# Patient Record
Sex: FEMALE | Race: ASIAN | URBAN - METROPOLITAN AREA
[De-identification: names, ages, dates, MRNs, and addresses within clinical notes are randomized per-mention and may not be internally consistent; named-entity substitution may affect disease eponyms.]

---

## 2018-10-09 ENCOUNTER — PRENATAL OFFICE VISIT (OUTPATIENT)
Dept: NURSING | Facility: CLINIC | Age: 23
End: 2018-10-09
Payer: MEDICAID

## 2018-10-09 ENCOUNTER — RESULT FOLLOW UP (OUTPATIENT)
Dept: OBGYN | Facility: CLINIC | Age: 23
End: 2018-10-09

## 2018-10-09 ENCOUNTER — PRENATAL OFFICE VISIT (OUTPATIENT)
Dept: OBGYN | Facility: CLINIC | Age: 23
End: 2018-10-09
Payer: MEDICAID

## 2018-10-09 VITALS
SYSTOLIC BLOOD PRESSURE: 110 MMHG | WEIGHT: 97.6 LBS | HEIGHT: 59 IN | HEART RATE: 91 BPM | DIASTOLIC BLOOD PRESSURE: 76 MMHG | TEMPERATURE: 98.4 F | BODY MASS INDEX: 19.68 KG/M2

## 2018-10-09 VITALS
BODY MASS INDEX: 19.71 KG/M2 | HEIGHT: 59 IN | SYSTOLIC BLOOD PRESSURE: 110 MMHG | DIASTOLIC BLOOD PRESSURE: 76 MMHG | HEART RATE: 91 BPM | TEMPERATURE: 98.4 F

## 2018-10-09 DIAGNOSIS — Z23 NEED FOR TDAP VACCINATION: Primary | ICD-10-CM

## 2018-10-09 DIAGNOSIS — O21.9 NAUSEA/VOMITING IN PREGNANCY: Primary | ICD-10-CM

## 2018-10-09 DIAGNOSIS — R87.610 ATYPICAL SQUAMOUS CELLS OF UNDETERMINED SIGNIFICANCE (ASCUS) ON PAPANICOLAOU SMEAR OF CERVIX: ICD-10-CM

## 2018-10-09 DIAGNOSIS — O30.009 TWIN PREGNANCY: ICD-10-CM

## 2018-10-09 DIAGNOSIS — Z12.4 CERVICAL CANCER SCREENING: ICD-10-CM

## 2018-10-09 DIAGNOSIS — O30.001 TWIN GESTATION IN FIRST TRIMESTER, UNSPECIFIED MULTIPLE GESTATION TYPE: ICD-10-CM

## 2018-10-09 LAB
ABO + RH BLD: NORMAL
ABO + RH BLD: NORMAL
ALBUMIN UR-MCNC: NEGATIVE MG/DL
APPEARANCE UR: CLEAR
BILIRUB UR QL STRIP: NEGATIVE
BLD GP AB SCN SERPL QL: NORMAL
BLOOD BANK CMNT PATIENT-IMP: NORMAL
COLOR UR AUTO: YELLOW
ERYTHROCYTE [DISTWIDTH] IN BLOOD BY AUTOMATED COUNT: 19.9 % (ref 10–15)
GLUCOSE UR STRIP-MCNC: NEGATIVE MG/DL
HCT VFR BLD AUTO: 36.3 % (ref 35–47)
HGB BLD-MCNC: 11.5 G/DL (ref 11.7–15.7)
HGB UR QL STRIP: NEGATIVE
KETONES UR STRIP-MCNC: NEGATIVE MG/DL
LEUKOCYTE ESTERASE UR QL STRIP: ABNORMAL
MCH RBC QN AUTO: 23.2 PG (ref 26.5–33)
MCHC RBC AUTO-ENTMCNC: 31.7 G/DL (ref 31.5–36.5)
MCV RBC AUTO: 73 FL (ref 78–100)
NITRATE UR QL: NEGATIVE
PH UR STRIP: 6 PH (ref 5–7)
PLATELET # BLD AUTO: 397 10E9/L (ref 150–450)
RBC # BLD AUTO: 4.96 10E12/L (ref 3.8–5.2)
SOURCE: ABNORMAL
SP GR UR STRIP: 1.02 (ref 1–1.03)
SPECIMEN EXP DATE BLD: NORMAL
UROBILINOGEN UR STRIP-ACNC: 0.2 EU/DL (ref 0.2–1)
WBC # BLD AUTO: 7.7 10E9/L (ref 4–11)

## 2018-10-09 PROCEDURE — 87491 CHLMYD TRACH DNA AMP PROBE: CPT | Performed by: OBSTETRICS & GYNECOLOGY

## 2018-10-09 PROCEDURE — 87086 URINE CULTURE/COLONY COUNT: CPT | Performed by: OBSTETRICS & GYNECOLOGY

## 2018-10-09 PROCEDURE — 99207 ZZC FIRST OB VISIT: CPT | Performed by: OBSTETRICS & GYNECOLOGY

## 2018-10-09 PROCEDURE — 36415 COLL VENOUS BLD VENIPUNCTURE: CPT | Performed by: OBSTETRICS & GYNECOLOGY

## 2018-10-09 PROCEDURE — 85027 COMPLETE CBC AUTOMATED: CPT | Performed by: OBSTETRICS & GYNECOLOGY

## 2018-10-09 PROCEDURE — 87389 HIV-1 AG W/HIV-1&-2 AB AG IA: CPT | Performed by: OBSTETRICS & GYNECOLOGY

## 2018-10-09 PROCEDURE — G0145 SCR C/V CYTO,THINLAYER,RESCR: HCPCS | Performed by: OBSTETRICS & GYNECOLOGY

## 2018-10-09 PROCEDURE — 83021 HEMOGLOBIN CHROMOTOGRAPHY: CPT | Mod: 90 | Performed by: OBSTETRICS & GYNECOLOGY

## 2018-10-09 PROCEDURE — 86900 BLOOD TYPING SEROLOGIC ABO: CPT | Performed by: OBSTETRICS & GYNECOLOGY

## 2018-10-09 PROCEDURE — 87591 N.GONORRHOEAE DNA AMP PROB: CPT | Performed by: OBSTETRICS & GYNECOLOGY

## 2018-10-09 PROCEDURE — G0499 HEPB SCREEN HIGH RISK INDIV: HCPCS | Performed by: OBSTETRICS & GYNECOLOGY

## 2018-10-09 PROCEDURE — 99000 SPECIMEN HANDLING OFFICE-LAB: CPT | Performed by: OBSTETRICS & GYNECOLOGY

## 2018-10-09 PROCEDURE — 86850 RBC ANTIBODY SCREEN: CPT | Performed by: OBSTETRICS & GYNECOLOGY

## 2018-10-09 PROCEDURE — 99207 ZZC NO CHARGE NURSE ONLY: CPT

## 2018-10-09 PROCEDURE — 81003 URINALYSIS AUTO W/O SCOPE: CPT | Performed by: OBSTETRICS & GYNECOLOGY

## 2018-10-09 PROCEDURE — 86901 BLOOD TYPING SEROLOGIC RH(D): CPT | Performed by: OBSTETRICS & GYNECOLOGY

## 2018-10-09 PROCEDURE — 86780 TREPONEMA PALLIDUM: CPT | Performed by: OBSTETRICS & GYNECOLOGY

## 2018-10-09 PROCEDURE — 86762 RUBELLA ANTIBODY: CPT | Performed by: OBSTETRICS & GYNECOLOGY

## 2018-10-09 PROCEDURE — G0124 SCREEN C/V THIN LAYER BY MD: HCPCS | Performed by: OBSTETRICS & GYNECOLOGY

## 2018-10-09 RX ORDER — PYRIDOXINE HCL (VITAMIN B6) 25 MG
25 TABLET ORAL 4 TIMES DAILY
Qty: 60 TABLET | Refills: 1 | Status: SHIPPED | OUTPATIENT
Start: 2018-10-09

## 2018-10-09 NOTE — LETTER
October 23, 2019      Shea Stacy  [address]  [place] [facility] [address]    Dear ,      At [facility], your health and wellness is our primary concern. That is why we are following up on an abnormal pap from 10/09/18, which was reported as ASCUS. Your provider had recommended that you have a Pap smear completed by 10/09/19. Our records do not show that this has been done or scheduled.    It is important to complete the follow up that your provider has suggested for you to ensure that there are no worsening changes which may, over time, develop into cancer.      Please contact our office at  [phone] to schedule an appointment for a Pap smear at your earliest convenience. If you have questions or concerns, please call the clinic and we will be happy to assist you.    If you have completed the tests outside of [facility], please have the results forwarded to our office. We will update the chart for your primary Physician to review before your next annual physical.     Thank you for choosing [facility]!    Sincerely,      Your [facility] Care Team//[facility]

## 2018-10-09 NOTE — LETTER
October 15, 2018      Shea Stacy  [address] [facility] [address]    Dear ,      This letter is in regards to your recent cervical cancer screening (Pap smear). Your Pap smear result was reported as ASCUS or Atypical Squamous Cells of Undetermined Significance. This means that there were mildly abnormal cells found in the sample that we collected from your cervix. The vast majority of patients with this result do not have significant cervical abnormalities.     Therefore, current guidelines recommend that you return in one year to repeat your Pap smear.      If you have additional questions regarding this result, please call our registered nurse, Leisa at [phone].    Sincerely,      Tiffanie Rocha MD/manoj

## 2018-10-09 NOTE — PROGRESS NOTES
OB - New OB History and Physical  Date of visit: 10/9/2018  Chief Complaint: To establish prenatal care    HPI: Shea Stacy is a 23 year old  at 9w2d as dated by US at outside facility.   Estimated Date of Delivery: May 12, 2019.    FOB involved and supportive.  They have known each other for around 5 years, dated for last 4-5 months.    Since becoming pregnant, patient reports she's been feeling poorly.  Has nausea and food aversions, but rare vomiting.  Hasn't taken anything for this yet.  Also had mild cramping and spotting x 2, but that has resolved.      US at [facility]  showing di/di twins, per patient.    Obstetric history:     Obstetric History       T0      L0     SAB0   TAB0   Ectopic0   Multiple0   Live Births0       # Outcome Date GA Lbr Steven/2nd Weight Sex Delivery Anes PTL Lv   2 Current            1 AB 17                Patient denies history of gestational hypertension, pre-eclampsia, gestational diabetes,  labor.    Gynecologic History:   Menstrual Interval: 28-30 days  Patient's last menstrual period was 2018.   STI history: denies  Last Pap: never  History of abnormal pap: n/a    Allergy: Review of patient's allergies indicates no known allergies.  Patient denies food, latex or environmental allergies.     Current Medications:  Current Outpatient Prescriptions   Medication     doxylamine (UNISOM) 25 MG TABS tablet     pyridOXINE (VITAMIN B-6) 25 MG tablet     No current facility-administered medications for this visit.        Past Medical History:  Past Medical History:   Diagnosis Date     Asthma     childhood       Past Surgical History:  Past Surgical History:   Procedure Laterality Date     C RAD RESEC TONSIL/PILLARS         Social History:  Patient lives with friend.  Patient's relationship status is: in relationship x 4 months.    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   Denies current tobacco, alcohol or recreational drug use.   She feels  "safe in her relationship. Patient denies history of sexual, physical or mental abuse.     Family History:  Family History   Problem Relation Age of Onset     Hypertension Mother      Other - See Comments Brother 6      at age 6.  Pt thinks due to complication of delivery     Hypertension Maternal Grandmother    Brother had some complication at birth, passed aaway at 5yo, which was the same year patient was born.    Review of Systems  Gen: + weight loss, no fever, no chills, no fatigue  CV: no palpitations, no chest pain, no hypertension, no syncope  Resp: no shortness of breath, no cough, no wheezing, no asthma  GI: + nausea, no vomiting, no diarrhea, no constipation, no bloating, no GERD  :  no vaginal discharge, no dysuria, no abnormal bleeding, no pelvic pain   Endo: no thyroid problems, no cold/heat intolerance, no acne, no hirsutism, no diabetes  Heme: no easy bruising or bleeding, no history of DVT/PE/CVA  Neuro: no headaches, no seizures, no strokes, no focal deficits      Physical Exam:  Vitals:    10/09/18 1639   BP: 110/76   Pulse: 91   Temp: 98.4  F (36.9  C)   Weight: 97 lb 9.6 oz (44.3 kg)   Height: 4' 11\" (1.499 m)     Body mass index is 19.71 kg/(m^2).     Gen: alert, oriented, no distress,  pleasant, appears stated age, appropriately groomed  Neck: supple, trachea midline, no thyromegaly, no lymphadenopathy  HEENT: head normocephalic, atraumatic, normal oropharynx without erythema or exudates  CV: normal heart sounds, regular rate and rhythm, no murmurs  Resp: good inspiratory effort, lungs clear to ascultation bilaterally, no wheezes or rhonchi  Breast: symmetrical without masses, skin changes or nipple discharge.   Abd: soft, nontender, nondistended, normoactive bowel sounds,  no masses or organomegaly, no hernias, no scars  : normal external genitalia with lesions or erythema; normal, well supported urethra, normal Bartholins, normal Skenes; normal pink rugated vaginal mucosa, no " lesions or abnormal discharge; small Carlos speculum inserted without difficulty; normal appearing cervix without lesions, bleeding or discharge. Bimanual exam shows 14week sized ante verted uterus, mobile, no fundal tenderness, no CMT, no adnexal masses or tenderness. Cervix closed, WNL  Extr: warm, well perfused, nontender, no edema  Psych: affect bright, cooperative, responds appropriately      Assessment:  Shea Stacy is a 23 year old  at 9w2d presenting to establish prenatal care.    BSUS shows likely di/di twin pregnancy with +FHT x 2    Problem List:   Twin gestation  Nausea in pregnancy    Plan:  1. Placed US order for MFM to confirm di/di twin gestation, for FTS.  2. Reviewed routine prenatal care. Discussed MD call schedule as well as role of residents and med students both in clinic and hospital.  She is okay  with resident care  3. Pap: obtaine today   4. Diet, Nutrition and Exercise:  Continue PNVs. Continue normal exercise. Her prepregnancy BMI is 20.  According to the WHO guidelines, patient is given a goal of gaining approximately 25-35 pounds during the course of her pregnancy.    5. Immunizations: plan TdaP at 28 weeks  6. Fetal anomaly screening: desires FTS.  Order placed  7. Routine Prenatal Care: the patient will return to clinic in 4 weeks and prn    Tiffanie Rocha MD

## 2018-10-09 NOTE — MR AVS SNAPSHOT
"              After Visit Summary   10/9/2018    Shea Stacy    MRN: [id number]           Patient Information     Date Of Birth          1995        Visit Information        Provider Department      10/9/2018 10:00 AM RD OB NURSE EDUCATION [facility]        Today's Diagnoses     Need for Tdap vaccination    -  1    Twin pregnancy           Follow-ups after your visit        Your next 10 appointments already scheduled     Oct 09, 2018 10:45 AM CDT   New Prenatal with Tiffanie Rocha MD   [facility] ([facility])    [address] [place] [id number] [phone]              Who to contact     If you have questions or need follow up information about today's clinic visit or your schedule please contact [facility] directly at [phone].  Normal or non-critical lab and imaging results will be communicated to you by [employer], letter or phone within 4 business days after the clinic has received the results. If you do not hear from us within 7 days, please contact the clinic through MyChart or phone. If you have a critical or abnormal lab result, we will notify you by phone as soon as possible.  Submit refill requests through [employer] or call your pharmacy and they will forward the refill request to us. Please allow 3 business days for your refill to be completed.          Additional Information About Your Visit        MyChart Information     [employer] lets you send messages to your doctor, view your test results, renew your prescriptions, schedule appointments and more. To sign up, go to [email or web address] . Click on \"Log in\" on the left side of the screen, which will take you to the Welcome page. Then click on \"Sign up Now\" on the right side of the page.     You will be asked to enter the access code listed below, as well as some personal information. Please follow the directions to create your username and " "password.     Your access code is: [email or web address]  Expires: 2019  9:59 AM     Your access code will  in 90 days. If you need help or a new code, please call your [facility] clinic or [phone].        Care EveryWhere ID     This is your Care EveryWhere ID. This could be used by other organizations to access your [facility] medical records  [id number]        Your Vitals Were     Pulse Temperature Height Last Period BMI (Body Mass Index)       91 98.4  F (36.9  C) 4' 11\" (1.499 m) 2018 19.71 kg/m2        Blood Pressure from Last 3 Encounters:   10/09/18 110/76    Weight from Last 3 Encounters:   10/09/18 97 lb 9.6 oz (44.3 kg)              We Performed the Following     ABO/Rh type and screen     CBC with platelets     Hepatitis B surface antigen     HGB Eval Reflex to ELP or RBC Solubility     HIV Antigen Antibody Combo     Rubella Antibody IgG Quantitative     Treponema Abs w Reflex to RPR and Titer     UA without Microscopic     Urine Culture Aerobic Bacterial        Primary Care Provider Fax #    Physician No Ref-Primary [phone]       No address on file        Equal Access to Services     PITER [facility]ON[facility] : Hadii aad ku hadasho Sosafiaali, waaxda luqadaha, qaybta kaalmada adeegyada, skye laurent hayalphonsen derrick wall . So [facility] [phone].    ATENCIÓN: Si habla español, tiene a yoder disposición servicios gratuitos de asistencia lingüística. Llame al [phone].    We comply with applicable federal civil rights laws and [place] laws. We do not discriminate on the basis of race, color, national origin, age, disability, sex, sexual orientation, or gender identity.            Thank you!     Thank you for choosing [facility]  for your care. Our goal is always to provide you with excellent care. Hearing back from our patients is one way we can continue to improve our services. Please take a few minutes to complete the written survey that you may receive in the mail after your visit " with us. Thank you!             Your Updated Medication List - Protect others around you: Learn how to safely use, store and throw away your medicines at www.disposemymeds.org.      Notice  As of 10/9/2018 10:37 AM    You have not been prescribed any medications.

## 2018-10-09 NOTE — PROGRESS NOTES
Important Information for Provider:     Patient presents for new ob teaching and labs,third pregnancy. Patient is pregnant with twins, ultrasound done at [facility] 9/25/18. Complains of nausea, recommended B6, would like Rx. Has NOB with Dr Rocha today    Caffeine intake/servings daily - 0  Calcium intake/servings daily - 3  Exercise 0 times weekly - describe ; encouraged walking,precautions given   Sunscreen used - Yes  Seatbelts used - Yes  Guns stored in the home - No  Self Breast Exam - No  Pap test up to date -  Never had one  Eye exam up to date -  Yes  Dental exam up to date -  Yes  Immunizations reviewed and up to date - Yes  Abuse: Current or Past (Physical, Sexual or Emotional) - No  Do you feel safe in your environment - Yes  Do you cope well with stress - Yes  Do you suffer from insomnia - No        Prenatal OB Questionnaire  Patient supplied answers from flow sheet for:  Prenatal OB Questionnaire.  Past Medical History  Diabetes?: No  Hypertension : No  Heart disease, mitral valve prolapse or rheumatic fever?: No  An autoimmune disease such as lupus or rheumatoid arthritis?: No  Kidney disease or urinary tract infection?: No  Epilepsy, seizures or spells?: No  Migraine headaches?: No  A stroke or loss of function or sensation?: No  Any other neurological problems?: No  Have you ever been treated for depression?: No  Are you having problems with crying spells or loss of self-esteem?: No  Have you ever required psychiatric care?: No  Have you ever had hepatitis, liver disease or jaundice?: No  Have you been treated for blood clots in your veins, deep vein thromosis, inflammation in the veins, thrombosis, phlebitis, pulmonary embolism or varicosities?: No  Have you had excessive bleeding after surgery or dental work?: No  Do you bleed more than other women after a cut or scratch?: No  Do you have a history of anemia?: No  Have you ever had thyroid problems or taken thyroid medication?: No   Do you have  any endocrine problems?: No  Have you ever been in a major accident or suffered serious trauma?: No  Within the last year, has anyone hit, slapped, kicked or otherwise hurt you?: No  In the last year, has anyone forced you to have sex when you didn't want to?: No    Past Medical History 2   Have you ever received a blood transfusion?: No  Would you refuse a blood transfusion if a doctor judged it to be medically necessary?: No   If you answered Yes, would you rather die than receive a blood transfusion?: No  If you answered Yes, is this for Nondenominational reasons?: No  Does anyone in your home smoke?: No  Do you use tobacco products?: No  Do you drink beer, wine or hard liquor?: No  Do you use any of the following: marijuana, speed, cocaine, heroin, hallucinogens or other drugs?: No   Is your blood type Rh negative?: No  Have you ever had abnormal antibodies in your blood?: No  Have you ever had asthma?: No  Have you ever had tuberculosis?: No  Do you have any allergies to drugs or over-the-counter medications?: No  Have you had any breast problems?: No  Have you ever ?: No  Have you had any gynecological surgical procedures such as cervical conization, a LEEP procedure, laser treatment, cryosurgery of the cervix or a dilation and curettage, etc?: No  Have you ever had any other surgical procedures?: No  Have you been hospitalized for a nonsurgical reason excluding normal delivery?: No  Have you ever had any anesthetic complications?: No  Have you ever had an abnormal pap smear?: No    Past Medical History (Continued)  Do you have a history of abnormalities of the uterus?: No  Did your mother take INDIGO or any other hormones when she was pregnant with you?: No  Did it take you more than a year to become pregnant?: No  Have you ever been evaluated or treated for infertility?: No  Is there a history of medical problems in your family, which you feel may be important to this pregnancy?: No  Do you have any other  problems we have not asked about which you feel may be important to this pregnancy?: No    Symptoms since last menstrual period  Do you have any of the following symptoms: abdominal pain, blood in stools or urine, chest pain, shortness of breath, coughing or vomiting up blood, your heart racing or skipping beats, nausea and vomiting, pain on urination or vaginal discharge or bleed: (!) Yes (nausea)  Will the patient be 35 years old or older at the time of delivery?: No    Has the patient, baby's father or anyone in either family had:  Thalassemia (Italian, Greek, Mediterranean or  background only) and an MCV result less than 80?: No  Neural tube defect such as meningomyelocele, spina bifida or anencephaly?: No  Congenital heart defect?: No  Down's Syndrome?: No  Morales-Sachs disease (Yarsani, Cajun, [place]uan[place])?: No  Sickle cell disease or trait ()?: No  Hemophilia or other inherited problems of blood?: No  Muscular dystrophy?: No  Cystic fibrosis?: No  [place]'s chorea?: No  Mental retardation/autism?: No  If yes, was the person tested for fragile X?: No  Any other inherited genetic or chromosomal disorder?: No  Maternal metabolic disorder (e.g Insulin-dependent diabetes, PKU)?: No  A child with birth defects not listed above?: (!) Yes (patient's brother  at 6 years old, unknown)  Recurrent pregnancy loss or stillbirth?: No   Has the patient had any medications/street drugs/alcohol since her last menstrual period?: No  Does the patient or baby's father have any other genetic risks?: No    Infection History   Do you object to being tested for Hepatitis B?: No  Do you object to being tested for HIV?: No   Do you feel that you are at high risk for coming in contact with the AIDS virus?: No  Have you ever been treated for tuberculosis?: No  Have you ever had a positive skin test for tuberculosis?: No  Do you live with someone who has tuberculosis?: No  Have you ever been exposed to tuberculosis?:  No  Do you have genital herpes?: No  Does your partner have genital herpes?: No  Have you had a viral illness since your last period?: No  Have you ever had gonorrhea, chlamydia, syphilis, venereal warts, trichomoniasis, pelvic inflammatory disease or any other sexually transmitted disease?: No  Do you know if you are a genital group B streptococcus carrier?: No  Have you had chicken pox/varicella?: (!) Yes   Have you been vaccinated against chicken Pox?: No  Have you had any other infectious diseases?: No      Allergies as of 10/9/2018:    Allergies as of 10/09/2018     (No Known Allergies)       Current medications are:  No current outpatient prescriptions on file.         Early ultrasound screening tool:    Does patient have irregular periods?  No  Did patient use hormonal birth control in the three months prior to positive urine pregnancy test? No  Is the patient breastfeeding?  No  Is the patient 10 weeks or greater at time of education visit?  No

## 2018-10-09 NOTE — LETTER
September 25, 2019      Shea Stacy  [address] [facility]  [place] [facility] [address]    Dear ,      At [facility], your health and wellness is our primary concern. That is why we are following up on an abnormal pap from 10/09/18, which was reported as ASCUS. Your provider had recommended that you have a Pap smear completed by 10/09/19. Our records do not show that this has been scheduled.    It is important to complete the follow up that your provider has suggested for you to ensure that there are no worsening changes which may, over time, develop into cancer.      Please contact our office at  [phone] to schedule an appointment for a Pap smear at your earliest convenience. If you have questions or concerns, please call the clinic and we will be happy to assist you.    If you have completed the tests outside of [facility], please have the results forwarded to our office. We will update the chart for your primary Physician to review before your next annual physical.     Thank you for choosing [facility]!    Sincerely,      Your [facility] Care Team//[facility]

## 2018-10-09 NOTE — MR AVS SNAPSHOT
After Visit Summary   10/9/2018    Shea Stacy    MRN: [id number]           Patient Information     Date Of Birth          1995        Visit Information        Provider Department      10/9/2018 10:45 AM Tiffanie Rocha MD [facility]        Today's Diagnoses     Nausea/vomiting in pregnancy    -  1    Twin gestation in first trimester, unspecified multiple gestation type        Cervical cancer screening           Follow-ups after your visit        Additional Services     MAT FETAL MED CTR REFERRAL-PREGNANCY       There is no height or weight on file to calculate BMI.    >> Patient may proceed with recommendations for further testing as directed by the Maternal Fetal Medicine Specialist >>    >> If requesting Fetal Echo: MFM will determine appropriate location for exam due to indication.    >> If requesting Lung Maturity Amnio:  If results indicate fetal lung maturity, induction or C/S is recommended within 36 hours.  Please schedule accordingly.     Dear Patient:   Please be aware that coverage of these services is subject to the terms and limitations of your health insurance plan.  Call member services at your health plan with any benefit or coverage questions.      Please bring the following to your appointment:    >>  Any x-rays, CTs or MRIs which have been performed.  Contact the facility where they were done to arrange for  prior to your scheduled appointment.  Any new CT, MRI or other procedures ordered by your specialist must be performed at a [place] facility or coordinated by your clinic's referral office.  >>  List of current medications   >>  This referral request   >>  Any documents/labs given to you for this referral                  Your next 10 appointments already scheduled     Oct 31, 2018  3:00 PM CDT   ESTABLISHED PRENATAL with Tiffanie Rocha MD   [facility] ([facility])    [address] [place] [address]  "[address]  [facility] [address]   [phone]              Future tests that were ordered for you today     Open Future Orders        Priority Expected Expires Ordered    MFM Genetic Counseling Routine  10/9/2019 10/9/2018    MFM Nuchal Trans w US Twins Routine  2019 10/9/2018            Who to contact     If you have questions or need follow up information about today's clinic visit or your schedule please contact [facility] directly at [phone].  Normal or non-critical lab and imaging results will be communicated to you by MyChart, letter or phone within 4 business days after the clinic has received the results. If you do not hear from us within 7 days, please contact the clinic through [employer] or phone. If you have a critical or abnormal lab result, we will notify you by phone as soon as possible.  Submit refill requests through [employer] or call your pharmacy and they will forward the refill request to us. Please allow 3 business days for your refill to be completed.          Additional Information About Your Visit        [employer] Information     [employer] lets you send messages to your doctor, view your test results, renew your prescriptions, schedule appointments and more. To sign up, go to [email or web address] . Click on \"Log in\" on the left side of the screen, which will take you to the Welcome page. Then click on \"Sign up Now\" on the right side of the page.     You will be asked to enter the access code listed below, as well as some personal information. Please follow the directions to create your username and password.     Your access code is: [email or web address]  Expires: 2019  9:59 AM     Your access code will  in 90 days. If you need help or a new code, please call your [place] clinic or [phone].        Care EveryWhere ID     This is your Care EveryWhere ID. This could be used by other organizations to access your [place] medical records  [id number]        Your Vitals " "Were     Pulse Temperature Height Last Period BMI (Body Mass Index)       91 98.4  F (36.9  C) 4' 11\" (1.499 m) 08/01/2018 19.71 kg/m2        Blood Pressure from Last 3 Encounters:   10/09/18 110/76   10/09/18 110/76    Weight from Last 3 Encounters:   10/09/18 97 lb 9.6 oz (44.3 kg)              We Performed the Following     CHLAMYDIA TRACHOMATIS PCR     MAT FETAL MED CTR REFERRAL-PREGNANCY     NEISSERIA GONORRHOEA PCR     Pap imaged thin layer screen only - recommended age 21 - 24 years          Today's Medication Changes          These changes are accurate as of 10/9/18  4:42 PM.  If you have any questions, ask your nurse or doctor.               Start taking these medicines.        Dose/Directions    doxylamine 25 MG Tabs tablet   Commonly known as:  UNISOM   Used for:  Nausea/vomiting in pregnancy   Started by:  Tiffanie Rocha MD        Dose:  12.5 mg   Take 0.5 tablets (12.5 mg) by mouth 2 times daily as needed (nausea and vomiting in pregnancy)   Quantity:  14 each   Refills:  1       pyridOXINE 25 MG tablet   Commonly known as:  vitamin B-6   Used for:  Nausea/vomiting in pregnancy   Started by:  Tiffanie Rocha MD        Dose:  25 mg   Take 1 tablet (25 mg) by mouth 4 times daily   Quantity:  60 tablet   Refills:  1            Where to get your medicines      These [facility] [place], [place] - [address] AT [facility] [address] [address]     Phone:  [phone]     doxylamine 25 MG Tabs tablet    pyridOXINE 25 MG tablet                Primary Care Provider Fax #    Physician No Ref-Primary [phone]       No address on file        Equal Access to Services     HARRY DESOUZA AH: Joie Dorman, wamandie luqadaha, qahaider kaalmada adekarlayada, skye [place] [phone].    ATENCIÓN: Si habla español, tiene a yoder disposición servicios gratuitos de " asistencia lingüística. Randall al [phone].    We comply with applicable federal civil rights laws and [place] laws. We do not discriminate on the basis of race, color, national origin, age, disability, sex, sexual orientation, or gender identity.            Thank you!     Thank you for choosing [facility]  for your care. Our goal is always to provide you with excellent care. Hearing back from our patients is one way we can continue to improve our services. Please take a few minutes to complete the written survey that you may receive in the mail after your visit with us. Thank you!             Your Updated Medication List - Protect others around you: Learn how to safely use, store and throw away your medicines at www.disposemymeds.org.          This list is accurate as of 10/9/18  4:42 PM.  Always use your most recent med list.                   Brand Name Dispense Instructions for use Diagnosis    doxylamine 25 MG Tabs tablet    UNISOM    14 each    Take 0.5 tablets (12.5 mg) by mouth 2 times daily as needed (nausea and vomiting in pregnancy)    Nausea/vomiting in pregnancy       pyridOXINE 25 MG tablet    vitamin B-6    60 tablet    Take 1 tablet (25 mg) by mouth 4 times daily    Nausea/vomiting in pregnancy

## 2018-10-10 ENCOUNTER — HEALTH MAINTENANCE LETTER (OUTPATIENT)
Age: 23
End: 2018-10-10

## 2018-10-10 LAB
BACTERIA SPEC CULT: NORMAL
C TRACH DNA SPEC QL NAA+PROBE: NEGATIVE
HBV SURFACE AG SERPL QL IA: NONREACTIVE
HGB A1 MFR BLD: 97 % (ref 95–97.9)
HGB A2 MFR BLD: 2.8 % (ref 2–3.5)
HGB C MFR BLD: 0 % (ref 0–0)
HGB E MFR BLD: 0 % (ref 0–0)
HGB F MFR BLD: 0.2 % (ref 0–2.1)
HGB FRACT BLD ELPH-IMP: NORMAL
HGB OTHER MFR BLD: 0 % (ref 0–0)
HGB S BLD QL SOLY: NORMAL
HGB S MFR BLD: 0 % (ref 0–0)
HIV 1+2 AB+HIV1 P24 AG SERPL QL IA: NONREACTIVE
N GONORRHOEA DNA SPEC QL NAA+PROBE: NEGATIVE
PATH INTERP BLD-IMP: NORMAL
RUBV IGG SERPL IA-ACNC: 4 IU/ML
SPECIMEN SOURCE: NORMAL
T PALLIDUM AB SER QL: NONREACTIVE

## 2018-10-11 PROBLEM — Z28.39 RUBELLA NON-IMMUNE STATUS, ANTEPARTUM: Status: ACTIVE | Noted: 2018-10-11

## 2018-10-11 PROBLEM — O09.899 RUBELLA NON-IMMUNE STATUS, ANTEPARTUM: Status: ACTIVE | Noted: 2018-10-11

## 2018-10-12 LAB
COPATH REPORT: ABNORMAL
PAP: ABNORMAL

## 2018-10-13 PROBLEM — R87.610 ATYPICAL SQUAMOUS CELLS OF UNDETERMINED SIGNIFICANCE (ASCUS) ON PAPANICOLAOU SMEAR OF CERVIX: Status: ACTIVE | Noted: 2018-10-09

## 2018-10-14 NOTE — PROGRESS NOTES
10/9/18 ASCUS pap at 22 yo. Plan pap in one year. ([facility])  10/13/18 Letter sent to pt through [employer] results and will also be sent via regular mail. ([facility])  10/15/18 Result letter sent per RN request (rl)  09/25/19 Pap reminder letter sent. ([facility])  10/23/19 Phone attempt, phone number listed not accepting calls/out of service. Additional reminder letter sent. ([facility])  11/20/19 Patient is lost to pap tracking follow-up. FYI routed to provider. ([facility])

## 2018-10-24 ENCOUNTER — PRE VISIT (OUTPATIENT)
Dept: MATERNAL FETAL MEDICINE | Facility: CLINIC | Age: 23
End: 2018-10-24

## 2018-10-26 ENCOUNTER — HOSPITAL ENCOUNTER (OUTPATIENT)
Dept: ULTRASOUND IMAGING | Facility: CLINIC | Age: 23
Discharge: HOME | End: 2018-10-26
Attending: OBSTETRICS & GYNECOLOGY | Admitting: OBSTETRICS & GYNECOLOGY
Payer: MEDICAID

## 2018-10-26 ENCOUNTER — OFFICE VISIT (OUTPATIENT)
Dept: MATERNAL FETAL MEDICINE | Facility: CLINIC | Age: 23
End: 2018-10-26
Attending: OBSTETRICS & GYNECOLOGY
Payer: MEDICAID

## 2018-10-26 DIAGNOSIS — O26.90 PREGNANCY RELATED CONDITION, ANTEPARTUM: ICD-10-CM

## 2018-10-26 DIAGNOSIS — O30.009 FIRST TRIMESTER SCREENING FOR TWIN PREGNANCY: Primary | ICD-10-CM

## 2018-10-26 DIAGNOSIS — O30.009 FIRST TRIMESTER SCREENING FOR TWIN PREGNANCY: ICD-10-CM

## 2018-10-26 DIAGNOSIS — Z36.82 ENCOUNTER FOR ANTENATAL SCREENING FOR NUCHAL TRANSLUCENCY: ICD-10-CM

## 2018-10-26 DIAGNOSIS — O30.049 DICHORIONIC DIAMNIOTIC TWIN PREGNANCY, ANTEPARTUM: Primary | ICD-10-CM

## 2018-10-26 DIAGNOSIS — Z36.89 FIRST TRIMESTER SCREENING FOR TWIN PREGNANCY: ICD-10-CM

## 2018-10-26 DIAGNOSIS — Z36.89 FIRST TRIMESTER SCREENING FOR TWIN PREGNANCY: Primary | ICD-10-CM

## 2018-10-26 PROCEDURE — 36415 COLL VENOUS BLD VENIPUNCTURE: CPT | Performed by: OBSTETRICS & GYNECOLOGY

## 2018-10-26 PROCEDURE — 82105 ALPHA-FETOPROTEIN SERUM: CPT | Performed by: OBSTETRICS & GYNECOLOGY

## 2018-10-26 PROCEDURE — 84163 PAPPA SERUM: CPT | Performed by: OBSTETRICS & GYNECOLOGY

## 2018-10-26 PROCEDURE — 84704 HCG FREE BETACHAIN TEST: CPT | Performed by: OBSTETRICS & GYNECOLOGY

## 2018-10-26 PROCEDURE — 40000072 ZZH STATISTIC GENETIC COUNSELING, < 16 MIN: Mod: ZF | Performed by: GENETIC COUNSELOR, MS

## 2018-10-26 PROCEDURE — 76814 OB US NUCHAL MEAS ADD-ON: CPT

## 2018-10-26 NOTE — MR AVS SNAPSHOT
After Visit Summary   10/26/2018    Shea Stacy    MRN: [id number]           Patient Information     Date Of Birth          1995        Visit Information        Provider Department      10/26/2018 12:15 PM Rory Aguilar MD [facility] Maternal Fetal Medicine [facility]        Today's Diagnoses     Dichorionic diamniotic twin pregnancy, antepartum    -  1    Encounter for  screening for nuchal translucency           Follow-ups after your visit        Your next 10 appointments already scheduled     Oct 31, 2018  3:00 PM CDT   ESTABLISHED PRENATAL with Tiffanie Rocha MD   [facility] ([facility])    [address] [facility]  [address]  [facility] [address] [phone]            Dec 14, 2018 11:00 AM CST   MFM US COMPRE TWINS with [id number]   [facility] Maternal Fetal Medicine Ultrasound - [place] ([facility])    [address]  [facility] [id number] [phone]           Wear comfortable clothes and leave your valuables at home.            Dec 14, 2018 12:15 PM CST   Radiology MD with UR SHANTA RICARDO   [facility] Maternal Fetal Medicine - [facility])    [address]  [facility] [id number] [phone]           Please arrive at the time given for your first appointment. This visit is used internally to schedule the physician's time during your ultrasound.              Future tests that were ordered for you today     Open Future Orders        Priority Expected Expires Ordered    MFM US Comprehensive Twins Routine  2019 10/26/2018    First Trimester Screen Biochem Markers Routine  2019 10/26/2018            Who to contact     If you have questions or need follow up information about today's clinic visit or your schedule please contact [facility] MATERNAL FETAL MEDICINE [facility] directly at [phone].  Normal or non-critical lab and imaging  results will be communicated to you by MyChart, letter or phone within 4 business days after the clinic has received the results. If you do not hear from us within 7 days, please contact the clinic through [employer] or phone. If you have a critical or abnormal lab result, we will notify you by phone as soon as possible.  Submit refill requests through [employer] or call your pharmacy and they will forward the refill request to us. Please allow 3 business days for your refill to be completed.          Additional Information About Your Visit        [employer] Information     [employer] gives you secure access to your electronic health record. If you see a primary care provider, you can also send messages to your care team and make appointments. If you have questions, please call your primary care clinic.  If you do not have a primary care provider, please call [phone] and they will assist you.        Care EveryWhere ID     This is your Care EveryWhere ID. This could be used by other organizations to access your [facility] medical records  [id number]        Your Vitals Were     Last Period                   08/01/2018            Blood Pressure from Last 3 Encounters:   10/09/18 110/76   10/09/18 110/76    Weight from Last 3 Encounters:   10/09/18 44.3 kg (97 lb 9.6 oz)               Primary Care Provider Fax #    Physician No Ref-Primary [phone]       No address on file        Equal Access to Services     HARRY DESOUZA : Hadii ghada cortezo Soomaali, waaxda luqadaha, qaybta kaalmada adeegyada, skye wall . So [facility] [phone].    ATENCIÓN: Si habla español, tiene a yoder disposición servicios gratuitos de asistencia lingüística. Llame al [phone].    We comply with applicable federal civil rights laws and [place] laws. We do not discriminate on the basis of race, color, national origin, age, disability, sex, sexual orientation, or gender identity.            Thank you!     Thank you for  choosing [facility]  for your care. Our goal is always to provide you with excellent care. Hearing back from our patients is one way we can continue to improve our services. Please take a few minutes to complete the written survey that you may receive in the mail after your visit with us. Thank you!             Your Updated Medication List - Protect others around you: Learn how to safely use, store and throw away your medicines at www.disposemymeds.org.          This list is accurate as of 10/26/18  1:01 PM.  Always use your most recent med list.                   Brand Name Dispense Instructions for use Diagnosis    doxylamine 25 MG Tabs tablet    UNISOM    14 each    Take 0.5 tablets (12.5 mg) by mouth 2 times daily as needed (nausea and vomiting in pregnancy)    Nausea/vomiting in pregnancy       pyridOXINE 25 MG tablet    vitamin B-6    60 tablet    Take 1 tablet (25 mg) by mouth 4 times daily    Nausea/vomiting in pregnancy

## 2018-10-26 NOTE — PROGRESS NOTES
Please refer to ultrasound report under 'Imaging' Studies of 'Chart Review' tabs.    Rory Aguilar M.D.

## 2018-10-26 NOTE — PROGRESS NOTES
[facility] [place]  Genetic Counseling Consult    Patient: Shea Stacy YOB: 1995   Date of Service: 10/26/18      Shea Stacy was seen with her partner, Eugene, at [facility] for genetic consultation to discuss the options for routine screening for fetal chromosome abnormalities.       Impression/Plan:   1.  Shea had an ultrasound and blood draw for first trimester screening.  Results are expected within 3-5 days, and will be available in [facility].  We will contact her at [phone] to discuss the results, and a copy will be forwarded to the office of the referring OB provider. She provided verbal permission to leave detailed results in a voicemail.     2. Maternal serum AFP (single marker screen) is recommended after 15 weeks to screen for open neural tube defects. A quad screen should not be performed.    Pregnancy History:   /Parity:    Age at Delivery: 23 year old  LEE: 2019, by Ultrasound  Gestational Age: 11w5d    No significant complications or exposures were reported in the current pregnancy.    Shea reyes pregnancy history is significant for a therapeutic  in 2017.    Medical History:   Shea reyes reported medical history is not expected to impact pregnancy management or risks to fetal development.       Family History:   A three-generation pedigree was obtained, and is scanned under the  Media  tab.   The following significant findings were reported by Shea:    Eugene reports that both he and his mother have congenital bilateral cataracts. Eugene states that his cataracts have never interfered with his vision. About 50% of the time, congenital bilateral cataracts has a genetic cause. It is most often inherited in an autosomal dominant manner. If Eugene inherited his cataracts in this manner there is a 50% chance for each of his children to inherit them as well.     Eugene reports a history of  seizures in childhood. His last seizure occurred when he was in middle school. He is now 25 and has not been on any anti-epileptic medication since before his last seizure. We discussed that seizures can have an underlying genetic cause. If this is the case for Eugene, each of his children would also be at an increased risk for seizures.     Shea reports that she had a brother who passed away around age 9, the same year she was born. She does not know his cause of death, but thinks it might have been related to complications from his birth. We discussed that without more information regarding her brother's death, it is difficult to determine whether this pregnancy is at an increased risk for something similar.     Shea reports that one of her paternal cousins has a diagnosis of autism. Some forms of autism spectrum disorders can be associated with specific genetic conditions, but most often is due to the combination of genes and environmental factors that can affect development.  We discussed that as the affected individual in the family is a 3rd or 4th degree relatives to the couple's children the chance of an autism diagnosisis likely not greater than the 1% general population chance.     Otherwise, the reported family history is negative for multiple miscarriages, stillbirths, birth defects, intellectual disability, known genetic conditions, and consanguinity.       Carrier Screening:   The patient reports that she is of  ancestry and the father of the pregnancy has  and  ancestry:      The hemoglobinopathies are a group of genetic blood diseases that occur with increased frequency in individuals of  ancestry and carrier screening for these conditions is available.  Carrier screening for the hemoglobinopathies includes a CBC with red blood cell indices, a ferritin level, and a quantitative hemoglobin electrophoresis or HPLC.  In addition,  screening in the [facility]  [place] includes many of the hemoglobinopathies.      Cystic fibrosis is an autosomal recessive genetic condition that occurs with increased frequency in individuals of  ancestry and carrier screening for this condition is available.  In addition,  screening in the [facility] includes cystic fibrosis.      Expanded carrier screening for mutations in a large panel of genes associated with autosomal recessive conditions including cystic fibrosis, spinal muscular atrophy, and others, is now available.      Carrier screening was not discussed today.       Risk Assessment for Chromosome Conditions:   We explained that the risk for fetal chromosome abnormalities increases with maternal age. We discussed specific features of common chromosome abnormalities, including Down syndrome, trisomy 13, trisomy 18, and sex chromosome trisomies.      - At age 24 at midtrimester, the risk to have a baby with Down syndrome is 1 in 1136.    - At age 24 at midtrimester, the risk to have a baby with any chromosome abnormality is 1 in 568.          Testing Options:   We discussed the following options:   First trimester screening    First trimester ultrasound with nuchal translucency and nasal bone assessments, maternal plasma hCG, MEHDRAD-A, and AFP measurement    Screens for fetal trisomy 21, trisomy 13, and trisomy 18    Cannot screen for open neural tube defects; maternal serum AFP after 15 weeks is recommended       Genetic Amniocentesis    Invasive procedure typically performed in the second trimester by which amniotic fluid is obtained for the purpose of chromosome analysis and/or other prenatal genetic analysis    Diagnostic results; >99% sensitivity for fetal chromosome abnormalities    AFAFP measurement tests for open neural tube defects       Comprehensive (Level II) ultrasound: Detailed ultrasound performed between 18-22 weeks gestation to screen for major birth defects and markers for  aneuploidy.        We reviewed the benefits and limitations of this testing.  Screening tests provide a risk assessment specific to the pregnancy for certain fetal chromosome abnormalities, but cannot definitively diagnose or exclude a fetal chromosome abnormality.  Follow-up genetic counseling and consideration of diagnostic testing is recommended with any abnormal screening result.      It was a pleasure to be involved with Shea [facility]. Face-to-face time of the meeting was 30 minutes.    Vandana Crawford [facility]  Certified Genetic Counselor  [phone]

## 2018-10-26 NOTE — MR AVS SNAPSHOT
After Visit Summary   10/26/2018    Shea Stacy    MRN: [id number]           Patient Information     Date Of Birth          1995        Visit Information        Provider Department      10/26/2018 11:00 AM Vandana Crawford GC [facility] Maternal Fetal Medicine [facility]        Today's Diagnoses     First trimester screening for twin pregnancy    -  1    Pregnancy related condition, antepartum           Follow-ups after your visit        Your next 10 appointments already scheduled     Oct 31, 2018  3:00 PM CDT   ESTABLISHED PRENATAL with Tiffanie Rocha MD   [facility] ([facility])    [address]  [address]  [facility] [address] [phone]            Dec 14, 2018 11:00 AM CST   MFM US COMPRE TWINS with [id number]   [facility] Maternal Fetal Medicine Ultrasound [facility])    [address]  [facility] [id number] [phone]           Wear comfortable clothes and leave your valuables at home.            Dec 14, 2018 12:15 PM CST   Radiology MD with UR SHANTA RICARDO   [facility] Maternal Fetal Medicine [facility])    [address]  [facility] [id number] [phone]           Please arrive at the time given for your first appointment. This visit is used internally to schedule the physician's time during your ultrasound.              Future tests that were ordered for you today     Open Future Orders        Priority Expected Expires Ordered    MFM US Comprehensive Twins Routine  8/26/2019 10/26/2018    First Trimester Screen Biochem Markers Routine  1/24/2019 10/26/2018            Who to contact     If you have questions or need follow up information about today's clinic visit or your schedule please contact [facility] MATERNAL FETAL MEDICINE [facility] directly at [phone].  Normal or non-critical lab and imaging results will be  communicated to you by Radialpointhart, letter or phone within 4 business days after the clinic has received the results. If you do not hear from us within 7 days, please contact the clinic through ProntoForms or phone. If you have a critical or abnormal lab result, we will notify you by phone as soon as possible.  Submit refill requests through ProntoForms or call your pharmacy and they will forward the refill request to us. Please allow 3 business days for your refill to be completed.          Additional Information About Your Visit        RadialpointharPOI Information     ProntoForms gives you secure access to your electronic health record. If you see a primary care provider, you can also send messages to your care team and make appointments. If you have questions, please call your primary care clinic.  If you do not have a primary care provider, please call [phone] and they will assist you.        Care EveryWhere ID     This is your Care EveryWhere ID. This could be used by other organizations to access your [facility] medical records  [id number]        Your Vitals Were     Last Period                   08/01/2018            Blood Pressure from Last 3 Encounters:   10/09/18 110/76   10/09/18 110/76    Weight from Last 3 Encounters:   10/09/18 44.3 kg (97 lb 9.6 oz)              We Performed the Following     [facility] Genetic Counseling        Primary Care Provider Fax #    Physician No Ref-Primary [phone]       No address on file        Equal Access to Services     [facility] : Hadii ghada Dorman, waaxda luqadaha, qaybta kaalmada adehaylee, skye wall . So [facility] [place] [phone].    ATENCIÓN: Si habla español, tiene a yoder disposición servicios gratuitos de asistencia lingüística. Llame al [phone].    We comply with applicable federal civil rights laws and [place] laws. We do not discriminate on the basis of race, color, national origin, age, disability, sex, sexual orientation, or gender  identity.            Thank you!     Thank you for choosing [facility]  for your care. Our goal is always to provide you with excellent care. Hearing back from our patients is one way we can continue to improve our services. Please take a few minutes to complete the written survey that you may receive in the mail after your visit with us. Thank you!             Your Updated Medication List - Protect others around you: Learn how to safely use, store and throw away your medicines at www.disposemymeds.org.          This list is accurate as of 10/26/18  1:09 PM.  Always use your most recent med list.                   Brand Name Dispense Instructions for use Diagnosis    doxylamine 25 MG Tabs tablet    UNISOM    14 each    Take 0.5 tablets (12.5 mg) by mouth 2 times daily as needed (nausea and vomiting in pregnancy)    Nausea/vomiting in pregnancy       pyridOXINE 25 MG tablet    vitamin B-6    60 tablet    Take 1 tablet (25 mg) by mouth 4 times daily    Nausea/vomiting in pregnancy

## 2018-10-31 ENCOUNTER — PRENATAL OFFICE VISIT (OUTPATIENT)
Dept: OBGYN | Facility: CLINIC | Age: 23
End: 2018-10-31
Payer: MEDICAID

## 2018-10-31 ENCOUNTER — TELEPHONE (OUTPATIENT)
Dept: MATERNAL FETAL MEDICINE | Facility: CLINIC | Age: 23
End: 2018-10-31

## 2018-10-31 VITALS
HEART RATE: 96 BPM | DIASTOLIC BLOOD PRESSURE: 67 MMHG | SYSTOLIC BLOOD PRESSURE: 104 MMHG | TEMPERATURE: 99.1 F | BODY MASS INDEX: 21.01 KG/M2 | WEIGHT: 104 LBS

## 2018-10-31 DIAGNOSIS — O30.049 DICHORIONIC DIAMNIOTIC TWIN PREGNANCY, ANTEPARTUM: Primary | ICD-10-CM

## 2018-10-31 PROCEDURE — 99207 ZZC PRENATAL VISIT: CPT | Performed by: OBSTETRICS & GYNECOLOGY

## 2018-10-31 NOTE — TELEPHONE ENCOUNTER
I spoke to Shea today regarding her normal first trimester screen results. Specifically, the chance this pregnancy has Down syndrome was reduced from her age related risk of 1 in 1,048 to 1 in 10,000 for each twin. Similarly, the chance this pregnancy has trisomy 18/trisomy 13 was reduced from her age related risk of 1 in 1,840 to 1 in 10,000 for each twin. This result significantly reduces the chance either twin has Down syndrome, trisomy 18, or trisomy 13.     We discussed that these results are very reassuring, but there remains a small chance for a false positive or false negative result. This screen also does not address all chromosome abnormalities or all causes of birth defects and cognitive delay. As such, Shea will still have the option of diagnostic testing ([facility] or amniocentesis) if she is interested or there is an indication later in the pregnancy. Shea declines additional testing or screening at this time. She also has the option of having a maternal serum AFP blood draw after 15 weeks to screen for ONTD; she is encouraged to discuss this option with her primary OB provider. Shea is also scheduled to return for a comprehensive ultrasound on 12/14/18.     All of Shea's questions were answered to her satisfaction and I encouraged her to contact me if she has any questions in the future. A copy of this note and her first trimester screen results will be forwarded to her primary OB provider.    Vandana Crawford MS, [facility]  Maternal Fetal Medicine  [facility]  Ph: [phone]

## 2018-10-31 NOTE — PROGRESS NOTES
12w3d  Doing well.  Had normal FTS.  Has follow-up scheduled.  Nausea is vastly improved.  Reports Unisom and B6 seems to make things worse, but she is doing better without any medication at this point.  No cramping or bleeding.  Feeling increasingly pregnant with each passing day.  RTC 4w  Tiffanie Rocha MD

## 2018-10-31 NOTE — MR AVS SNAPSHOT
After Visit Summary   10/31/2018    Shea Stacy    MRN: [id number]           Patient Information     Date Of Birth          1995        Visit Information        Provider Department      10/31/2018 3:00 PM Tiffanie Rocha MD [facility]        Today's Diagnoses     Dichorionic diamniotic twin pregnancy, antepartum    -  1       Follow-ups after your visit        Your next 10 appointments already scheduled     Dec 14, 2018 11:00 AM KYRA WOMACK US COMPRE TWINS with URMFMUSR3   [facility] Maternal Fetal Medicine Ultrasound - [facility])    [address]  [facility] [id number] [phone]           Wear comfortable clothes and leave your valuables at home.            Dec 14, 2018 12:15 PM CST   Radiology MD with UR SHANTA RICARDO   [facility] Maternal Fetal Medicine - [facility])    [address]  [facility] [address]   [phone]           Please arrive at the time given for your first appointment. This visit is used internally to schedule the physician's time during your ultrasound.              Who to contact     If you have questions or need follow up information about today's clinic visit or your schedule please contact [facility] directly at [phone].  Normal or non-critical lab and imaging results will be communicated to you by MyChart, letter or phone within 4 business days after the clinic has received the results. If you do not hear from us within 7 days, please contact the clinic through MyChart or phone. If you have a critical or abnormal lab result, we will notify you by phone as soon as possible.  Submit refill requests through oneDrum or call your pharmacy and they will forward the refill request to us. Please allow 3 business days for your refill to be completed.          Additional Information About Your Visit        MyChart Information      RoundPegg gives you secure access to your electronic health record. If you see a primary care provider, you can also send messages to your care team and make appointments. If you have questions, please call your primary care clinic.  If you do not have a primary care provider, please call [phone] and they will assist you.        Care EveryWhere ID     This is your Care EveryWhere ID. This could be used by other organizations to access your [place] medical records  [id number]        Your Vitals Were     Pulse Temperature Last Period BMI (Body Mass Index)          96 99.1  F (37.3  C) (Oral) 08/01/2018 21.01 kg/m2         Blood Pressure from Last 3 Encounters:   10/31/18 104/67   10/09/18 110/76   10/09/18 110/76    Weight from Last 3 Encounters:   10/31/18 104 lb (47.2 kg)   10/09/18 97 lb 9.6 oz (44.3 kg)              Today, you had the following     No orders found for display       Primary Care Provider Fax #    Physician No Ref-Primary [phone]       No address on file        Equal Access to Services     [facility]: Hadii ghada Dorman, waaxda lufrederick, qaybta kaalmaseema solis, skye wall . So [facility] [phone].    ATENCIÓN: Si habla español, tiene a yoder disposición servicios gratuitos de asistencia lingüística. Llame al [phone].    We comply with applicable federal civil rights laws and [place] laws. We do not discriminate on the basis of race, color, national origin, age, disability, sex, sexual orientation, or gender identity.            Thank you!     Thank you for choosing [facility]  for your care. Our goal is always to provide you with excellent care. Hearing back from our patients is one way we can continue to improve our services. Please take a few minutes to complete the written survey that you may receive in the mail after your visit with us. Thank you!             Your Updated Medication List - Protect others around you:  Learn how to safely use, store and throw away your medicines at www.disposemymeds.org.          This list is accurate as of 10/31/18  3:45 PM.  Always use your most recent med list.                   Brand Name Dispense Instructions for use Diagnosis    doxylamine 25 MG Tabs tablet    UNISOM    14 each    Take 0.5 tablets (12.5 mg) by mouth 2 times daily as needed (nausea and vomiting in pregnancy)    Nausea/vomiting in pregnancy       pyridOXINE 25 MG tablet    vitamin B-6    60 tablet    Take 1 tablet (25 mg) by mouth 4 times daily    Nausea/vomiting in pregnancy

## 2018-11-01 LAB
LAB SCANNED RESULT: NORMAL
LAB SCANNED RESULT: NORMAL

## 2018-11-15 ENCOUNTER — OFFICE VISIT (OUTPATIENT)
Dept: MIDWIFE SERVICES | Facility: CLINIC | Age: 23
End: 2018-11-15
Payer: COMMERCIAL

## 2018-11-15 VITALS
BODY MASS INDEX: 19.79 KG/M2 | HEART RATE: 86 BPM | WEIGHT: 98 LBS | DIASTOLIC BLOOD PRESSURE: 58 MMHG | SYSTOLIC BLOOD PRESSURE: 101 MMHG

## 2018-11-15 DIAGNOSIS — N76.0 BV (BACTERIAL VAGINOSIS): Primary | ICD-10-CM

## 2018-11-15 DIAGNOSIS — N94.9 VAGINAL DISCOMFORT: ICD-10-CM

## 2018-11-15 DIAGNOSIS — B96.89 BV (BACTERIAL VAGINOSIS): Primary | ICD-10-CM

## 2018-11-15 DIAGNOSIS — B37.31 YEAST INFECTION OF THE VAGINA: ICD-10-CM

## 2018-11-15 LAB
SPECIMEN SOURCE: ABNORMAL
WET PREP SPEC: ABNORMAL

## 2018-11-15 PROCEDURE — 99213 OFFICE O/P EST LOW 20 MIN: CPT | Performed by: ADVANCED PRACTICE MIDWIFE

## 2018-11-15 PROCEDURE — 87210 SMEAR WET MOUNT SALINE/INK: CPT | Performed by: ADVANCED PRACTICE MIDWIFE

## 2018-11-15 RX ORDER — METRONIDAZOLE 500 MG/1
500 TABLET ORAL 2 TIMES DAILY
Qty: 14 TABLET | Refills: 0 | Status: SHIPPED | OUTPATIENT
Start: 2018-11-15 | End: 2018-12-03

## 2018-11-15 RX ORDER — MICONAZOLE NITRATE 2 %
1 CREAM WITH APPLICATOR VAGINAL AT BEDTIME
Qty: 45 G | Refills: 0 | Status: SHIPPED | OUTPATIENT
Start: 2018-11-15

## 2018-11-15 NOTE — MR AVS SNAPSHOT
After Visit Summary   11/15/2018    Shea Stacy    MRN: [id number]           Patient Information     Date Of Birth          1995        Visit Information        Provider Department      11/15/2018 1:15 PM Elsa Morales APRN CNM [facility] [place]        Today's Diagnoses     BV (bacterial vaginosis)    -  1    Vaginal discomfort        Yeast infection of the vagina           Follow-ups after your visit        Your next 10 appointments already scheduled     Nov 29, 2018  2:45 PM CST   ESTABLISHED PRENATAL with Tiffanie Rocha MD   [facility] [place] ([facility] [place])    [address]  [address]  [facility] [address] [phone]            Dec 12, 2018  8:45 AM CST   MFM US COMPRE TWINS with URMFMUSR2   [facility] Maternal Fetal Medicine Ultrasound - [facility])    [address]  [facility] [id number] [phone]           Wear comfortable clothes and leave your valuables at home.            Dec 12, 2018 10:00 AM CST   Radiology MD with UR SHANAT RICARDO   [facility] Maternal Fetal Medicine - [facility])    [address]  [facility] [id number] [phone]           Please arrive at the time given for your first appointment. This visit is used internally to schedule the physician's time during your ultrasound.              Who to contact     If you have questions or need follow up information about today's clinic visit or your schedule please contact [facility] directly at [phone].  Normal or non-critical lab and imaging results will be communicated to you by MyChart, letter or phone within 4 business days after the clinic has received the results. If you do not hear from us within 7 days, please contact the clinic through MyChart or phone. If you have a critical or abnormal lab result, we will notify you by phone as  soon as possible.  Submit refill requests through ImpactGames or call your pharmacy and they will forward the refill request to us. Please allow 3 business days for your refill to be completed.          Additional Information About Your Visit        Yava Technologieshart Information     ImpactGames gives you secure access to your electronic health record. If you see a primary care provider, you can also send messages to your care team and make appointments. If you have questions, please call your primary care clinic.  If you do not have a primary care provider, please call [phone] and they will assist you.        Care EveryWhere ID     This is your Care EveryWhere ID. This could be used by other organizations to access your [place] medical records  [id number]        Your Vitals Were     Pulse Last Period BMI (Body Mass Index)             86 08/01/2018 19.79 kg/m2          Blood Pressure from Last 3 Encounters:   11/15/18 101/58   10/31/18 104/67   10/09/18 110/76    Weight from Last 3 Encounters:   11/15/18 98 lb (44.5 kg)   10/31/18 104 lb (47.2 kg)   10/09/18 97 lb 9.6 oz (44.3 kg)              We Performed the Following     Wet prep          Today's Medication Changes          These changes are accurate as of 11/15/18  2:15 PM.  If you have any questions, ask your nurse or doctor.               Start taking these medicines.        Dose/Directions    metroNIDAZOLE 500 MG tablet   Commonly known as:  FLAGYL   Used for:  BV (bacterial vaginosis)   Started by:  Elsa Morales APRN CNM        Dose:  500 mg   Take 1 tablet (500 mg) by mouth 2 times daily   Quantity:  14 tablet   Refills:  0       miconazole 2 % cream   Commonly known as:  EQ MICONAZOLE 7 DAY TREATMENT   Used for:  Yeast infection of the vagina   Started by:  Elsa Morales APRN CNM        Dose:  1 applicator   Place 1 applicator vaginally At Bedtime   Quantity:  45 g   Refills:  0            Where to get your medicines      These medications  were sent to [facility] - [place], [place] - [address] [facility] [address]  [address], [facility] [address]     Phone:  [phone]     metroNIDAZOLE 500 MG tablet    miconazole 2 % cream                Primary Care Provider Fax #    Physician No Ref-Primary [phone]       No address on file        Equal Access to Services     HARRY DESOUZA : Hadii ghada ku hadasho Soomaali, waaxda luqadaha, qaybta kaalmada adeegyada, waxay emrein hayalphonsen derrick cottojuliannatrixie wall . So [facility] [place] [phone].    ATENCIÓN: Si danilola espfrandy, tiene a yoder disposición servicios gratuitos de asistencia lingüística. Randall al [phone].    We comply with applicable federal civil rights laws and [place] laws. We do not discriminate on the basis of race, color, national origin, age, disability, sex, sexual orientation, or gender identity.            Thank you!     Thank you for choosing [facility]  for your care. Our goal is always to provide you with excellent care. Hearing back from our patients is one way we can continue to improve our services. Please take a few minutes to complete the written survey that you may receive in the mail after your visit with us. Thank you!             Your Updated Medication List - Protect others around you: Learn how to safely use, store and throw away your medicines at www.disposemymeds.org.          This list is accurate as of 11/15/18  2:15 PM.  Always use your most recent med list.                   Brand Name Dispense Instructions for use Diagnosis    doxylamine 25 MG Tabs tablet    UNISOM    14 each    Take 0.5 tablets (12.5 mg) by mouth 2 times daily as needed (nausea and vomiting in pregnancy)    Nausea/vomiting in pregnancy       metroNIDAZOLE 500 MG tablet    FLAGYL    14 tablet    Take 1 tablet (500 mg) by mouth 2 times daily    BV (bacterial vaginosis)       miconazole 2 % cream    EQ MICONAZOLE 7 DAY TREATMENT    45 g    Place 1  applicator vaginally At Bedtime    Yeast infection of the vagina       ondansetron 4 MG tablet    ZOFRAN    30 tablet    Take 1 tablet (4 mg) by mouth every 6 hours as needed for nausea    Nausea and vomiting in pregnancy       pyridOXINE 25 MG tablet    vitamin B-6    60 tablet    Take 1 tablet (25 mg) by mouth 4 times daily    Nausea/vomiting in pregnancy

## 2018-11-15 NOTE — NURSING NOTE
"Chief Complaint   Patient presents with     Vaginal Problem     possible yeast infection       Initial /58 (BP Location: Left arm, Patient Position: Sitting, Cuff Size: Adult Regular)  Pulse 86  Wt 98 lb (44.5 kg)  LMP 2018  BMI 19.79 kg/m2 Estimated body mass index is 19.79 kg/(m^2) as calculated from the following:    Height as of 10/9/18: 4' 11\" (1.499 m).    Weight as of this encounter: 98 lb (44.5 kg).  BP completed using cuff size: regular    Questioned patient about current smoking habits.  Pt. has never smoked.          The following HM Due: NONE      The following patient reported/Care Every where data was sent to:  P ABSTRACT QUALITY INITIATIVES [[id number]]  REZA Beckman           "

## 2018-11-15 NOTE — PROGRESS NOTES
SUBJECTIVE:  Shea Stacy is a 23 year old female presents with discharge described as copious and white, local irritation and vulvar itching for 2 days. She reports a Hx of  Yeast infections, perhaps 2x year. Ussually occurring after intercourse.     Contraception Shea is 14 wks pregnant with a twin pregnancy.     REVIEW OF SYSTEMS  C: NEGATIVE for fever, chills, change in weight  R: NEGATIVE for significant cough or SOB  CV: NEGATIVE for chest pain, palpitations or peripheral edema  GI: NEGATIVE for nausea, abdominal pain, heartburn, or change in bowel habits  : NEGATIVE for frequency, dysuria, or hematuria      General medical, surgical, OB/Gyn and social histories   reviewed and updated in Histories section of [facility].     OBJECTIVE:   EXAM  /58 (BP Location: Left arm, Patient Position: Sitting, Cuff Size: Adult Regular)  Pulse 86  Wt 98 lb (44.5 kg)  LMP 08/01/2018  BMI 19.79 kg/m2  Patient appears well.    Abdomen normal, soft without tenderness, guarding, mass or organomegaly.   No inguinal adenopathy or CVA tenderness.    PELVIC EXAM:  PELVIC EXAM:  Vulva: BUS WNL, no lesions noted,   Vagina: Discharge copious and white, no lesions, well rugated, good tone,   Rectal exam: deferred    BSUS: FHTs seen in both IUPs.     WET PREP: Positive for yeast  and clue cells   GC/CHLAMYDIA CULTURE OBTAINED:PATIENT DECLINED    ASSESSMENT:   yeast, bacterial vaginosis.  (N94.9) Vaginal discomfort  (primary encounter diagnosis)  Comment:   Plan: Wet prep              PLAN:  Treatment plan per orders in [facility].   STD prevention discussed. Birth control needs reviewed.  Abstain from intercourse for duration of treatment.   Return if symptoms do not resolve as anticipated.  Given letter/ handout on healthy vaginal environment.      Elsa Morales CNM

## 2018-11-29 ENCOUNTER — PRENATAL OFFICE VISIT (OUTPATIENT)
Dept: OBGYN | Facility: CLINIC | Age: 23
End: 2018-11-29
Payer: COMMERCIAL

## 2018-11-29 VITALS
BODY MASS INDEX: 20.8 KG/M2 | DIASTOLIC BLOOD PRESSURE: 71 MMHG | OXYGEN SATURATION: 100 % | WEIGHT: 103 LBS | HEART RATE: 121 BPM | SYSTOLIC BLOOD PRESSURE: 108 MMHG

## 2018-11-29 DIAGNOSIS — N94.9 ROUND LIGAMENT PAIN: Primary | ICD-10-CM

## 2018-11-29 DIAGNOSIS — O30.049 DICHORIONIC DIAMNIOTIC TWIN PREGNANCY, ANTEPARTUM: ICD-10-CM

## 2018-11-29 DIAGNOSIS — M54.5 MIDLINE LOW BACK PAIN, UNSPECIFIED CHRONICITY, WITH SCIATICA PRESENCE UNSPECIFIED: ICD-10-CM

## 2018-11-29 PROCEDURE — 99207 ZZC PRENATAL VISIT: CPT | Performed by: OBSTETRICS & GYNECOLOGY

## 2018-11-29 NOTE — PROGRESS NOTES
16w4d  Having some lower back and round ligament pain.  Otherwise doing well.  Discussed maternity support belt, which she will try.  Fetal survey scheduled on 12/12.  RTC 4w  Tiffanie Rocha MD

## 2018-11-29 NOTE — MR AVS SNAPSHOT
After Visit Summary   11/29/2018    Shea Stacy    MRN: [id number]           Patient Information     Date Of Birth          1995        Visit Information        Provider Department      11/29/2018 2:45 PM Tiffanie Rocha MD [facility]        Today's Diagnoses     Round ligament pain    -  1    Dichorionic diamniotic twin pregnancy, antepartum        Midline low back pain, unspecified chronicity, with sciatica presence unspecified           Follow-ups after your visit        Your next 10 appointments already scheduled     Dec 12, 2018  8:45 AM CST   MFM US COMPRE TWINS with [id number]   [facility])    [address]  [facility] [address] [phone]           Wear comfortable clothes and leave your valuables at home.            Dec 12, 2018 10:00 AM CST   Radiology MD with UR SHANTA RICARDO   [facility])    [address]  [facility] [id number] [phone]           Please arrive at the time given for your first appointment. This visit is used internally to schedule the physician's time during your ultrasound.            Jan 02, 2019  2:00 PM CST   ESTABLISHED PRENATAL with Tiffanie Rocha MD   [facility] ([facility])    [address] [place] [id number] [phone]              Who to contact     If you have questions or need follow up information about today's clinic visit or your schedule please contact [facility] directly at [phone].  Normal or non-critical lab and imaging results will be communicated to you by MyChart, letter or phone within 4 business days after the clinic has received the results. If you do not hear from us within 7 days, please contact the clinic through MyChart or phone. If you  have a critical or abnormal lab result, we will notify you by phone as soon as possible.  Submit refill requests through [employer] or call your pharmacy and they will forward the refill request to us. Please allow 3 business days for your refill to be completed.          Additional Information About Your Visit        [employer] Information     [employer] gives you secure access to your electronic health record. If you see a primary care provider, you can also send messages to your care team and make appointments. If you have questions, please call your primary care clinic.  If you do not have a primary care provider, please call [phone] and they will assist you.        Care EveryWhere ID     This is your Care EveryWhere ID. This could be used by other organizations to access your [facility] medical records  [id number]        Your Vitals Were     Pulse Last Period Pulse Oximetry Breastfeeding? BMI (Body Mass Index)       121 08/01/2018 100% No 20.8 kg/m2        Blood Pressure from Last 3 Encounters:   11/29/18 108/71   11/15/18 101/58   10/31/18 104/67    Weight from Last 3 Encounters:   11/29/18 103 lb (46.7 kg)   11/15/18 98 lb (44.5 kg)   10/31/18 104 lb (47.2 kg)              Today, you had the following     No orders found for display         Today's Medication Changes          These changes are accurate as of 11/29/18 11:59 PM.  If you have any questions, ask your nurse or doctor.               Start taking these medicines.        Dose/Directions    order for DME   Used for:  Round ligament pain, Midline low back pain, unspecified chronicity, with sciatica presence unspecified   Started by:  Tiffanie Rocha MD        Equipment being ordered: maternity support belt   Quantity:  1 Piece   Refills:  0            Where to get your medicines      Some of these will need a paper prescription and others can be bought over the counter.  Ask your nurse if you have questions.     Bring a paper prescription for each of  these medications     order for DME                Primary Care Provider Fax #    Physician No Ref-Primary [phone]       No address on file        Equal Access to Services     HARRY DESOUZA : Hadii aad ku hadjorge Dorman, cortez porschefrederick, kp solis, skye Foster [facility] [phone].    ATENCIÓN: Si habla español, tiene a yoder disposición servicios gratuitos de asistencia lingüística. Llame al [phone].    We comply with applicable federal civil rights laws and [place] laws. We do not discriminate on the basis of race, color, national origin, age, disability, sex, sexual orientation, or gender identity.            Thank you!     Thank you for choosing [facility]  for your care. Our goal is always to provide you with excellent care. Hearing back from our patients is one way we can continue to improve our services. Please take a few minutes to complete the written survey that you may receive in the mail after your visit with us. Thank you!             Your Updated Medication List - Protect others around you: Learn how to safely use, store and throw away your medicines at www.disposemymeds.org.          This list is accurate as of 11/29/18 11:59 PM.  Always use your most recent med list.                   Brand Name Dispense Instructions for use Diagnosis    doxylamine 25 MG Tabs tablet    UNISOM    14 each    Take 0.5 tablets (12.5 mg) by mouth 2 times daily as needed (nausea and vomiting in pregnancy)    Nausea/vomiting in pregnancy       metroNIDAZOLE 500 MG tablet    FLAGYL    14 tablet    Take 1 tablet (500 mg) by mouth 2 times daily    BV (bacterial vaginosis)       miconazole 2 % cream    EQ MICONAZOLE 7 DAY TREATMENT    45 g    Place 1 applicator vaginally At Bedtime    Yeast infection of the vagina       ondansetron 4 MG tablet    ZOFRAN    30 tablet    Take 1 tablet (4 mg) by mouth every 6 hours as needed for nausea    Nausea and vomiting  in pregnancy       order for DME     1 Piece    Equipment being ordered: maternity support belt    Round ligament pain, Midline low back pain, unspecified chronicity, with sciatica presence unspecified       vitamin B6 25 MG tablet    pyridOXINE    60 tablet    Take 1 tablet (25 mg) by mouth 4 times daily    Nausea/vomiting in pregnancy

## 2018-12-03 ENCOUNTER — OFFICE VISIT (OUTPATIENT)
Dept: URGENT CARE | Facility: URGENT CARE | Age: 23
End: 2018-12-03
Payer: COMMERCIAL

## 2018-12-03 VITALS
DIASTOLIC BLOOD PRESSURE: 76 MMHG | TEMPERATURE: 97.6 F | HEART RATE: 94 BPM | OXYGEN SATURATION: 98 % | BODY MASS INDEX: 20.4 KG/M2 | SYSTOLIC BLOOD PRESSURE: 115 MMHG | WEIGHT: 101 LBS

## 2018-12-03 DIAGNOSIS — R10.32 ABDOMINAL PAIN, LEFT LOWER QUADRANT: ICD-10-CM

## 2018-12-03 DIAGNOSIS — O30.049 DICHORIONIC DIAMNIOTIC TWIN PREGNANCY, ANTEPARTUM: ICD-10-CM

## 2018-12-03 DIAGNOSIS — N30.00 ACUTE CYSTITIS WITHOUT HEMATURIA: Primary | ICD-10-CM

## 2018-12-03 DIAGNOSIS — R39.15 URGENCY OF URINATION: ICD-10-CM

## 2018-12-03 DIAGNOSIS — R35.0 URINARY FREQUENCY: ICD-10-CM

## 2018-12-03 LAB
ALBUMIN UR-MCNC: NEGATIVE MG/DL
APPEARANCE UR: ABNORMAL
BACTERIA #/AREA URNS HPF: ABNORMAL /HPF
BILIRUB UR QL STRIP: NEGATIVE
COLOR UR AUTO: YELLOW
GLUCOSE UR STRIP-MCNC: NEGATIVE MG/DL
HGB UR QL STRIP: NEGATIVE
KETONES UR STRIP-MCNC: NEGATIVE MG/DL
LEUKOCYTE ESTERASE UR QL STRIP: ABNORMAL
NITRATE UR QL: NEGATIVE
NON-SQ EPI CELLS #/AREA URNS LPF: ABNORMAL /LPF
PH UR STRIP: 6 PH (ref 5–7)
RBC #/AREA URNS AUTO: ABNORMAL /HPF
SOURCE: ABNORMAL
SP GR UR STRIP: >1.03 (ref 1–1.03)
SPECIMEN SOURCE: NORMAL
UROBILINOGEN UR STRIP-ACNC: 0.2 EU/DL (ref 0.2–1)
WBC #/AREA URNS AUTO: ABNORMAL /HPF
WET PREP SPEC: NORMAL

## 2018-12-03 PROCEDURE — 99214 OFFICE O/P EST MOD 30 MIN: CPT | Performed by: PHYSICIAN ASSISTANT

## 2018-12-03 PROCEDURE — 87086 URINE CULTURE/COLONY COUNT: CPT | Performed by: PHYSICIAN ASSISTANT

## 2018-12-03 PROCEDURE — 81001 URINALYSIS AUTO W/SCOPE: CPT | Performed by: PHYSICIAN ASSISTANT

## 2018-12-03 PROCEDURE — 87210 SMEAR WET MOUNT SALINE/INK: CPT | Performed by: PHYSICIAN ASSISTANT

## 2018-12-03 ASSESSMENT — ENCOUNTER SYMPTOMS
WOUND: 0
MYALGIAS: 0
RESPIRATORY NEGATIVE: 1
LIGHT-HEADEDNESS: 0
HEMATOLOGIC/LYMPHATIC NEGATIVE: 1
COUGH: 0
SHORTNESS OF BREATH: 0
DIZZINESS: 0
BRUISES/BLEEDS EASILY: 0
NAUSEA: 0
ALLERGIC/IMMUNOLOGIC NEGATIVE: 1
EYES NEGATIVE: 1
MUSCULOSKELETAL NEGATIVE: 1
ENDOCRINE NEGATIVE: 1
BACK PAIN: 0
SORE THROAT: 0
NECK STIFFNESS: 0
ABDOMINAL PAIN: 1
RHINORRHEA: 0
CHILLS: 0
FEVER: 0
VOMITING: 0
CARDIOVASCULAR NEGATIVE: 1
HEADACHES: 0
PALPITATIONS: 0
ARTHRALGIAS: 0
NECK PAIN: 0
WEAKNESS: 0
FREQUENCY: 1
DIARRHEA: 0
JOINT SWELLING: 0

## 2018-12-03 NOTE — PROGRESS NOTES
Chief Complaint:    Chief Complaint   Patient presents with     Abdominal Pain     x today- left lower abdominal pain- sometimes radiates into right       HPI: Shea Stacy is an 23 year old female who presents for evaluation and treatment of abdominal pain.  Patient is currently 17 weeks pregnant with twins.  The pain started roughly 3 weeks.  She states that the pain got much worse this morning.  She has had some urinary frequency and urgency that started roughly 1 week ago.  She was in to see her OB provider 4 days ago and was told that this pain is normal during pregnancy.  She denies any diarrhea, vomiting, or dizziness. No chest pain, or fever.  No vaginal discharge or dysuria.     Patient is new to [place]    ROS:      Review of Systems   Constitutional: Negative for chills and fever.   HENT: Negative for congestion, ear pain, rhinorrhea and sore throat.    Eyes: Negative.    Respiratory: Negative.  Negative for cough and shortness of breath.    Cardiovascular: Negative.  Negative for chest pain and palpitations.   Gastrointestinal: Positive for abdominal pain. Negative for diarrhea, nausea and vomiting.   Endocrine: Negative.    Genitourinary: Positive for frequency and urgency.   Musculoskeletal: Negative.  Negative for arthralgias, back pain, joint swelling, myalgias, neck pain and neck stiffness.   Skin: Negative.  Negative for rash and wound.   Allergic/Immunologic: Negative.  Negative for immunocompromised state.   Neurological: Negative for dizziness, weakness, light-headedness and headaches.   Hematological: Negative.  Does not bruise/bleed easily.        No pertinent family or medical Hx at this time.  Patient has never smoked.  No pertinent surgical Hx at this time.    Family History   Family History   Problem Relation Age of Onset     Hypertension Mother      Other - See Comments Brother 6      at age 6.  Pt thinks due to complication of delivery     Hypertension Maternal Grandmother         Social History  Social History     Social History     Marital status: Single     Spouse name: N/A     Number of children: N/A     Years of education: N/A     Occupational History     Not on file.     Social History Main Topics     Smoking status: Never Smoker     Smokeless tobacco: Never Used     Alcohol use No     Drug use: No     Sexual activity: Yes     Partners: Male     Other Topics Concern     Not on file     Social History Narrative        Surgical History:  Past Surgical History:   Procedure Laterality Date     C RAD RESEC TONSIL/PILLARS  2001        Problem List:  Patient Active Problem List   Diagnosis     Need for Tdap vaccination     Twin pregnancy     Rubella non-immune status, antepartum     Atypical squamous cells of undetermined significance (ASCUS) on Papanicolaou smear of cervix        Allergies:  No Known Allergies     Current Meds:    Current Outpatient Prescriptions:      amoxicillin-clavulanate (AUGMENTIN) 875-125 MG tablet, Take 1 tablet by mouth 2 times daily for 7 days, Disp: 14 tablet, Rfl: 0     doxylamine (UNISOM) 25 MG TABS tablet, Take 0.5 tablets (12.5 mg) by mouth 2 times daily as needed (nausea and vomiting in pregnancy), Disp: 14 each, Rfl: 1     ondansetron (ZOFRAN) 4 MG tablet, Take 1 tablet (4 mg) by mouth every 6 hours as needed for nausea, Disp: 30 tablet, Rfl: 1     miconazole (EQ MICONAZOLE 7 DAY TREATMENT) 2 % cream, Place 1 applicator vaginally At Bedtime (Patient not taking: Reported on 12/3/2018), Disp: 45 g, Rfl: 0     order for DME, Equipment being ordered: maternity support belt (Patient not taking: Reported on 12/3/2018), Disp: 1 Piece, Rfl: 0     pyridOXINE (VITAMIN B-6) 25 MG tablet, Take 1 tablet (25 mg) by mouth 4 times daily (Patient not taking: Reported on 12/3/2018), Disp: 60 tablet, Rfl: 1     PHYSICAL EXAM:     Vital signs noted and reviewed by Carlos Carias  /76 (BP Location: Left arm, Patient Position: Chair, Cuff Size: Adult Small)  Pulse 94   Temp 97.6  F (36.4  C) (Oral)  Wt 101 lb (45.8 kg)  LMP 08/01/2018  SpO2 98%  BMI 20.4 kg/m2     PEFR:    Physical Exam   Constitutional: She is oriented to person, place, and time. She appears well-developed and well-nourished. No distress.   HENT:   Head: Normocephalic and atraumatic.   Right Ear: Tympanic membrane and external ear normal.   Left Ear: Tympanic membrane and external ear normal.   Mouth/Throat: Oropharynx is clear and moist.   Eyes: EOM are normal. Pupils are equal, round, and reactive to light.   Neck: Trachea normal, normal range of motion and full passive range of motion without pain. Neck supple.   Cardiovascular: Normal rate, regular rhythm, S1 normal, S2 normal, normal heart sounds and intact distal pulses.  Exam reveals no gallop and no friction rub.    No murmur heard.  Pulmonary/Chest: Effort normal and breath sounds normal. No respiratory distress. She has no decreased breath sounds. She has no wheezes. She has no rhonchi. She has no rales.   Abdominal: Soft. Bowel sounds are normal. She exhibits no distension and no mass. There is no hepatosplenomegaly. There is tenderness in the left lower quadrant. There is no rigidity, no rebound, no guarding and no CVA tenderness.   Lymphadenopathy:     She has no cervical adenopathy.   Neurological: She is alert and oriented to person, place, and time. She has normal reflexes. No cranial nerve deficit.   Skin: Skin is warm and dry. She is not diaphoretic.   Psychiatric: She has a normal mood and affect. Her behavior is normal. Judgment and thought content normal.   Nursing note and vitals reviewed.       Labs:     Results for orders placed or performed in visit on 12/03/18   UA reflex to Microscopic and Culture   Result Value Ref Range    Color Urine Yellow     Appearance Urine Slightly Cloudy     Glucose Urine Negative NEG^Negative mg/dL    Bilirubin Urine Negative NEG^Negative    Ketones Urine Negative NEG^Negative mg/dL    Specific Gravity  Urine >1.030 1.003 - 1.035    Blood Urine Negative NEG^Negative    pH Urine 6.0 5.0 - 7.0 pH    Protein Albumin Urine Negative NEG^Negative mg/dL    Urobilinogen Urine 0.2 0.2 - 1.0 EU/dL    Nitrite Urine Negative NEG^Negative    Leukocyte Esterase Urine Small (A) NEG^Negative    Source Midstream Urine    Urine Microscopic   Result Value Ref Range    WBC Urine 5-10 (A) OTO5^0 - 5 /HPF    RBC Urine O - 2 OTO2^O - 2 /HPF    Squamous Epithelial /LPF Urine Many (A) FEW^Few /LPF    Bacteria Urine Moderate (A) NEG^Negative /HPF   Wet prep   Result Value Ref Range    Specimen Description Vagina     Wet Prep No Trichomonas seen     Wet Prep No yeast seen     Wet Prep No clue cells seen          Medical Decision Making:    Differential Diagnosis:  Abdominal Pain: Diverticular Disease, Abdominal Wall, Bowel Obstruction, Non Specific and uti      ASSESSMENT:     1. Acute cystitis without hematuria    2. Dichorionic diamniotic twin pregnancy, antepartum    3. Abdominal pain, left lower quadrant    4. Urinary frequency    5. Urgency of urination           PLAN:     Patient presents for 3 weeks of worsening LLQ abdominal pain with 1 week of worsening urinary frequency and urgency. Patient is currently 17 weeks pregnant with twins and considered high risk. Her vital signs are stable and she is afebrile.  She does have moderate tenderness with palpation of the LLQ.    Wet prep was unremarkable.  Urine discussed with patient.  With symptoms, will treat with Augmentin.  We will call with culture results if resistant.  Worrisome symptoms discussed with instructions to go to the ED.  She will follow up with her OB provider this week for re-evaluation.  Patient given letter for work.  Patient verbalized understanding and agreed with this plan.     Carlos Carias  12/3/2018, 12:27 PM

## 2018-12-03 NOTE — LETTER
[facility]  [address]  [facility] [address]          December 3, 2018    RE:  Shea Stacy                                                                                                                                                       [address] [facility] [address]            To whom it may concern:    Shea Stacy is under my professional care for illness.  She may return to work her next available shift.    Sincerely,        Carlos Carias PA-C

## 2018-12-03 NOTE — MR AVS SNAPSHOT
After Visit Summary   12/3/2018    Shea Stacy    MRN: [id number]           Patient Information     Date Of Birth          1995        Visit Information        Provider Department      12/3/2018 12:10 PM Carlos Carias PA-C [facility]        Today's Diagnoses     Acute cystitis without hematuria    -  1    Dichorionic diamniotic twin pregnancy, antepartum        Abdominal pain, left lower quadrant        Urinary frequency        Urgency of urination           Follow-ups after your visit        Your next 10 appointments already scheduled     Dec 12, 2018  8:45 AM CST   MFM US COMPRE TWINS with [id number]   [facility])    [address]  [facility] [address] [phone]           Wear comfortable clothes and leave your valuables at home.            Dec 12, 2018 10:00 AM CST   Radiology MD with UR MFMICHELLE RICARDO   [facility])    [address]  [facility] [id number] [phone]           Please arrive at the time given for your first appointment. This visit is used internally to schedule the physician's time during your ultrasound.            Jan 02, 2019  2:00 PM CST   ESTABLISHED PRENATAL with Tiffanie Rocha MD   [facility] ([facility])    [address] [place] [address] [phone]              Who to contact     If you have questions or need follow up information about today's clinic visit or your schedule please contact [facility] directly at [phone].  Normal or non-critical lab and imaging results will be communicated to you by MyChart, letter or phone within 4 business days after the clinic has received the results. If you do not hear from us within 7 days, please contact the clinic through  [employer] or phone. If you have a critical or abnormal lab result, we will notify you by phone as soon as possible.  Submit refill requests through [employer] or call your pharmacy and they will forward the refill request to us. Please allow 3 business days for your refill to be completed.          Additional Information About Your Visit        [facility] Information     [employer] gives you secure access to your electronic health record. If you see a primary care provider, you can also send messages to your care team and make appointments. If you have questions, please call your primary care clinic.  If you do not have a primary care provider, please call [phone] and they will assist you.        Care EveryWhere ID     This is your Care EveryWhere ID. This could be used by other organizations to access your [place] medical records  [id number]        Your Vitals Were     Pulse Temperature Last Period Pulse Oximetry BMI (Body Mass Index)       94 97.6  F (36.4  C) (Oral) 08/01/2018 98% 20.4 kg/m2        Blood Pressure from Last 3 Encounters:   12/03/18 115/76   11/29/18 108/71   11/15/18 101/58    Weight from Last 3 Encounters:   12/03/18 101 lb (45.8 kg)   11/29/18 103 lb (46.7 kg)   11/15/18 98 lb (44.5 kg)              We Performed the Following     UA reflex to Microscopic and Culture     Urine Culture Aerobic Bacterial     Urine Microscopic     Wet prep          Today's Medication Changes          These changes are accurate as of 12/3/18  2:01 PM.  If you have any questions, ask your nurse or doctor.               Start taking these medicines.        Dose/Directions    amoxicillin-clavulanate 875-125 MG tablet   Commonly known as:  AUGMENTIN   Used for:  Acute cystitis without hematuria   Started by:  Carlos Carias PA-C        Dose:  1 tablet   Take 1 tablet by mouth 2 times daily for 7 days   Quantity:  14 tablet   Refills:  0            Where to get your medicines      These medications were sent to [facility] - [place], [place] - [address]  [address], [facility] [address]     Phone:  [phone]     amoxicillin-clavulanate 875-125 MG tablet                Primary Care Provider Fax #    Physician No Ref-Primary [phone]       No address on file        Equal Access to Services     HARRY DESOUZA : Hadii aad ku hadasho Soomaali, waaxda luqadaha, qaybta kaalmada adeegyada, waxay idiin hayaan adekarla kharash duke . So [facility] [phone].    ATENCIÓN: Si habla español, tiene a yoder disposición servicios gratuitos de asistencia lingüística. Llame al [phone].    We comply with applicable federal civil rights laws and [place] laws. We do not discriminate on the basis of race, color, national origin, age, disability, sex, sexual orientation, or gender identity.            Thank you!     Thank you for choosing [facility]  for your care. Our goal is always to provide you with excellent care. Hearing back from our patients is one way we can continue to improve our services. Please take a few minutes to complete the written survey that you may receive in the mail after your visit with us. Thank you!             Your Updated Medication List - Protect others around you: Learn how to safely use, store and throw away your medicines at www.disposemymeds.org.          This list is accurate as of 12/3/18  2:01 PM.  Always use your most recent med list.                   Brand Name Dispense Instructions for use Diagnosis    amoxicillin-clavulanate 875-125 MG tablet    AUGMENTIN    14 tablet    Take 1 tablet by mouth 2 times daily for 7 days    Acute cystitis without hematuria       doxylamine 25 MG Tabs tablet    UNISOM    14 each    Take 0.5 tablets (12.5 mg) by mouth 2 times daily as needed (nausea and vomiting in pregnancy)    Nausea/vomiting in pregnancy       miconazole 2 % cream    EQ MICONAZOLE 7 DAY TREATMENT    45 g    Place 1 applicator vaginally At Bedtime    Yeast  infection of the vagina       ondansetron 4 MG tablet    ZOFRAN    30 tablet    Take 1 tablet (4 mg) by mouth every 6 hours as needed for nausea    Nausea and vomiting in pregnancy       order for DME     1 Piece    Equipment being ordered: maternity support belt    Round ligament pain, Midline low back pain, unspecified chronicity, with sciatica presence unspecified       vitamin B6 25 MG tablet    pyridOXINE    60 tablet    Take 1 tablet (25 mg) by mouth 4 times daily    Nausea/vomiting in pregnancy

## 2018-12-04 LAB
BACTERIA SPEC CULT: NORMAL
SPECIMEN SOURCE: NORMAL

## 2018-12-12 ENCOUNTER — HOSPITAL ENCOUNTER (OUTPATIENT)
Dept: ULTRASOUND IMAGING | Facility: CLINIC | Age: 23
Discharge: HOME | End: 2018-12-12
Attending: OBSTETRICS & GYNECOLOGY | Admitting: OBSTETRICS & GYNECOLOGY
Payer: COMMERCIAL

## 2018-12-12 ENCOUNTER — OFFICE VISIT (OUTPATIENT)
Dept: MATERNAL FETAL MEDICINE | Facility: CLINIC | Age: 23
End: 2018-12-12
Attending: OBSTETRICS & GYNECOLOGY
Payer: COMMERCIAL

## 2018-12-12 DIAGNOSIS — O30.042 DICHORIONIC DIAMNIOTIC TWIN PREGNANCY IN SECOND TRIMESTER: Primary | ICD-10-CM

## 2018-12-12 DIAGNOSIS — O30.049 DICHORIONIC DIAMNIOTIC TWIN PREGNANCY, ANTEPARTUM: ICD-10-CM

## 2018-12-12 PROCEDURE — 76812 OB US DETAILED ADDL FETUS: CPT

## 2018-12-12 NOTE — PROGRESS NOTES
"Please see \"Imaging\" tab under \"Chart Review\" for details of today's US.    Deepika Pineda, DO    "

## 2019-02-08 ENCOUNTER — TELEPHONE (OUTPATIENT)
Dept: MATERNAL FETAL MEDICINE | Facility: CLINIC | Age: 24
End: 2019-02-08

## 2020-03-11 ENCOUNTER — HEALTH MAINTENANCE LETTER (OUTPATIENT)
Age: 25
End: 2020-03-11

## 2021-01-03 ENCOUNTER — HEALTH MAINTENANCE LETTER (OUTPATIENT)
Age: 26
End: 2021-01-03

## 2021-04-25 ENCOUNTER — HEALTH MAINTENANCE LETTER (OUTPATIENT)
Age: 26
End: 2021-04-25

## 2021-10-10 ENCOUNTER — HEALTH MAINTENANCE LETTER (OUTPATIENT)
Age: 26
End: 2021-10-10

## 2022-05-21 ENCOUNTER — HEALTH MAINTENANCE LETTER (OUTPATIENT)
Age: 27
End: 2022-05-21

## 2022-09-18 ENCOUNTER — HEALTH MAINTENANCE LETTER (OUTPATIENT)
Age: 27
End: 2022-09-18

## 2023-06-04 ENCOUNTER — HEALTH MAINTENANCE LETTER (OUTPATIENT)
Age: 28
End: 2023-06-04